# Patient Record
Sex: FEMALE | Race: WHITE | HISPANIC OR LATINO | Employment: UNEMPLOYED | URBAN - METROPOLITAN AREA
[De-identification: names, ages, dates, MRNs, and addresses within clinical notes are randomized per-mention and may not be internally consistent; named-entity substitution may affect disease eponyms.]

---

## 2017-01-11 ENCOUNTER — HOSPITAL ENCOUNTER (OUTPATIENT)
Dept: INFUSION CENTER | Facility: CLINIC | Age: 37
Discharge: HOME | End: 2017-01-11
Payer: MEDICARE

## 2017-01-11 VITALS
DIASTOLIC BLOOD PRESSURE: 89 MMHG | HEART RATE: 63 BPM | TEMPERATURE: 98.7 F | RESPIRATION RATE: 18 BRPM | SYSTOLIC BLOOD PRESSURE: 135 MMHG

## 2017-01-11 PROCEDURE — 96372 THER/PROPH/DIAG INJ SC/IM: CPT

## 2017-01-11 RX ADMIN — OMALIZUMAB 150 MG: 202.5 INJECTION, SOLUTION SUBCUTANEOUS at 14:39

## 2017-01-11 NOTE — PROGRESS NOTES
Xolair injections tolerated in each arm, pt observed for 30 min post without adverse event, pt to return as scheduled

## 2017-02-01 ENCOUNTER — HOSPITAL ENCOUNTER (OUTPATIENT)
Dept: INFUSION CENTER | Facility: CLINIC | Age: 37
Discharge: HOME | End: 2017-02-01
Payer: MEDICARE

## 2017-02-03 ENCOUNTER — HOSPITAL ENCOUNTER (OUTPATIENT)
Dept: INFUSION CENTER | Facility: CLINIC | Age: 37
Discharge: HOME | End: 2017-02-03
Payer: MEDICARE

## 2017-02-03 VITALS
DIASTOLIC BLOOD PRESSURE: 66 MMHG | SYSTOLIC BLOOD PRESSURE: 118 MMHG | TEMPERATURE: 98.3 F | RESPIRATION RATE: 16 BRPM | OXYGEN SATURATION: 99 % | HEART RATE: 67 BPM

## 2017-02-03 PROCEDURE — 96372 THER/PROPH/DIAG INJ SC/IM: CPT

## 2017-02-03 RX ADMIN — OMALIZUMAB 150 MG: 202.5 INJECTION, SOLUTION SUBCUTANEOUS at 15:12

## 2017-02-22 ENCOUNTER — HOSPITAL ENCOUNTER (OUTPATIENT)
Dept: INFUSION CENTER | Facility: CLINIC | Age: 37
Discharge: HOME | End: 2017-02-22
Payer: MEDICARE

## 2017-02-22 VITALS
SYSTOLIC BLOOD PRESSURE: 91 MMHG | DIASTOLIC BLOOD PRESSURE: 62 MMHG | OXYGEN SATURATION: 98 % | RESPIRATION RATE: 18 BRPM | HEART RATE: 64 BPM | TEMPERATURE: 98.6 F

## 2017-02-22 PROCEDURE — 96372 THER/PROPH/DIAG INJ SC/IM: CPT

## 2017-02-22 RX ADMIN — OMALIZUMAB 150 MG: 202.5 INJECTION, SOLUTION SUBCUTANEOUS at 15:12

## 2017-02-22 RX ADMIN — OMALIZUMAB 150 MG: 202.5 INJECTION, SOLUTION SUBCUTANEOUS at 15:11

## 2017-02-22 NOTE — PROGRESS NOTES
Pt  Denies new symptoms or concerns at this time  Epi Pen present, Exp 3/2018  Xolair ordered for injection today

## 2017-03-15 ENCOUNTER — HOSPITAL ENCOUNTER (OUTPATIENT)
Dept: INFUSION CENTER | Facility: CLINIC | Age: 37
Discharge: HOME | End: 2017-03-15
Payer: MEDICARE

## 2017-03-15 VITALS
DIASTOLIC BLOOD PRESSURE: 61 MMHG | RESPIRATION RATE: 18 BRPM | SYSTOLIC BLOOD PRESSURE: 122 MMHG | HEART RATE: 63 BPM | TEMPERATURE: 97 F

## 2017-03-15 PROCEDURE — 96372 THER/PROPH/DIAG INJ SC/IM: CPT

## 2017-03-15 RX ADMIN — OMALIZUMAB 150 MG: 202.5 INJECTION, SOLUTION SUBCUTANEOUS at 14:47

## 2017-03-15 NOTE — PROGRESS NOTES
xolair given in 2 separate injections of 150mg each for a total of 300mg, given in right and left arm, band aids applied  Start of 30min observation

## 2017-04-05 ENCOUNTER — HOSPITAL ENCOUNTER (OUTPATIENT)
Dept: INFUSION CENTER | Facility: CLINIC | Age: 37
Discharge: HOME | End: 2017-04-05
Payer: MEDICARE

## 2017-04-05 VITALS
RESPIRATION RATE: 18 BRPM | HEART RATE: 82 BPM | DIASTOLIC BLOOD PRESSURE: 69 MMHG | SYSTOLIC BLOOD PRESSURE: 134 MMHG | TEMPERATURE: 98.3 F

## 2017-04-05 PROCEDURE — 96372 THER/PROPH/DIAG INJ SC/IM: CPT

## 2017-04-05 RX ADMIN — OMALIZUMAB 150 MG: 202.5 INJECTION, SOLUTION SUBCUTANEOUS at 14:57

## 2017-04-26 ENCOUNTER — HOSPITAL ENCOUNTER (OUTPATIENT)
Dept: INFUSION CENTER | Facility: CLINIC | Age: 37
Discharge: HOME | End: 2017-04-26
Payer: MEDICARE

## 2017-04-26 VITALS
TEMPERATURE: 98.4 F | HEART RATE: 70 BPM | SYSTOLIC BLOOD PRESSURE: 128 MMHG | DIASTOLIC BLOOD PRESSURE: 71 MMHG | RESPIRATION RATE: 18 BRPM

## 2017-04-26 PROCEDURE — 96372 THER/PROPH/DIAG INJ SC/IM: CPT

## 2017-04-26 RX ADMIN — OMALIZUMAB 150 MG: 202.5 INJECTION, SOLUTION SUBCUTANEOUS at 14:26

## 2017-04-26 RX ADMIN — OMALIZUMAB 150 MG: 202.5 INJECTION, SOLUTION SUBCUTANEOUS at 14:25

## 2017-04-26 NOTE — PROGRESS NOTES
Pt  tolerated treatment without incident, No negative effects noted after 30 minute observation  AVS declined  Next appt  confirmed

## 2017-05-17 ENCOUNTER — HOSPITAL ENCOUNTER (OUTPATIENT)
Dept: INFUSION CENTER | Facility: CLINIC | Age: 37
Discharge: HOME | End: 2017-05-17
Payer: MEDICARE

## 2017-05-17 VITALS
DIASTOLIC BLOOD PRESSURE: 60 MMHG | SYSTOLIC BLOOD PRESSURE: 120 MMHG | HEART RATE: 72 BPM | RESPIRATION RATE: 18 BRPM | TEMPERATURE: 98.2 F

## 2017-05-17 PROCEDURE — 96372 THER/PROPH/DIAG INJ SC/IM: CPT

## 2017-05-17 RX ADMIN — OMALIZUMAB 150 MG: 202.5 INJECTION, SOLUTION SUBCUTANEOUS at 14:43

## 2017-05-17 NOTE — PROGRESS NOTES
Pt  offers no complaints  Epi pen in room, exp  March 2018 
Xolair admin  SQ  in ANDRE and ELIO without incident  No negative effects noted after 30 minute observation  Next appt  confirmed 
none

## 2017-06-07 ENCOUNTER — HOSPITAL ENCOUNTER (OUTPATIENT)
Dept: INFUSION CENTER | Facility: CLINIC | Age: 37
Discharge: HOME | End: 2017-06-07
Payer: MEDICARE

## 2017-06-08 ENCOUNTER — HOSPITAL ENCOUNTER (OUTPATIENT)
Dept: INFUSION CENTER | Facility: CLINIC | Age: 37
Discharge: HOME | End: 2017-06-08
Payer: MEDICARE

## 2017-06-08 VITALS
RESPIRATION RATE: 18 BRPM | HEART RATE: 84 BPM | TEMPERATURE: 99 F | DIASTOLIC BLOOD PRESSURE: 69 MMHG | SYSTOLIC BLOOD PRESSURE: 135 MMHG

## 2017-06-08 PROCEDURE — 96372 THER/PROPH/DIAG INJ SC/IM: CPT

## 2017-06-08 RX ADMIN — OMALIZUMAB 150 MG: 202.5 INJECTION, SOLUTION SUBCUTANEOUS at 15:38

## 2017-06-08 NOTE — PROGRESS NOTES
Xolair SQ inj given in B/L UE  EpiPen exp March 2018   Observed 30 min post tx w/out any adverse effects

## 2017-06-20 ENCOUNTER — LAB CONVERSION - ENCOUNTER (OUTPATIENT)
Dept: OTHER | Facility: OTHER | Age: 37
End: 2017-06-20

## 2017-06-20 LAB
25(OH)D3 SERPL-MCNC: 20 NG/ML (ref 30–100)
CALCIUM SERPL-MCNC: 9 MG/DL (ref 8.6–10.2)
DEPRECATED RDW RBC AUTO: 14.1 % (ref 11–15)
FERRITIN SERPL-MCNC: 29 NG/ML (ref 10–154)
HCT VFR BLD AUTO: 40.4 % (ref 35–45)
HGB BLD-MCNC: 13.2 G/DL (ref 11.7–15.5)
MCH RBC QN AUTO: 26.8 PG (ref 27–33)
MCHC RBC AUTO-ENTMCNC: 32.6 G/DL (ref 32–36)
MCV RBC AUTO: 82.2 FL (ref 80–100)
PLATELET # BLD AUTO: 188 THOUSAND/UL (ref 140–400)
PMV BLD AUTO: 10.6 FL (ref 7.5–12.5)
PTH-INTACT SERPL-MCNC: 32 PG/ML (ref 14–64)
RBC # BLD AUTO: 4.92 MILLION/UL (ref 3.8–5.1)
VITAMIN B1, WHOLE BLOOD (HISTORICAL): 96 NMOL/L (ref 78–185)
WBC # BLD AUTO: 6.5 THOUSAND/UL (ref 3.8–10.8)

## 2017-06-26 ENCOUNTER — GENERIC CONVERSION - ENCOUNTER (OUTPATIENT)
Dept: OTHER | Facility: OTHER | Age: 37
End: 2017-06-26

## 2017-06-28 ENCOUNTER — HOSPITAL ENCOUNTER (OUTPATIENT)
Dept: INFUSION CENTER | Facility: CLINIC | Age: 37
Discharge: HOME | End: 2017-06-28
Payer: MEDICARE

## 2017-06-28 VITALS
OXYGEN SATURATION: 98 % | HEART RATE: 74 BPM | TEMPERATURE: 98.7 F | RESPIRATION RATE: 16 BRPM | SYSTOLIC BLOOD PRESSURE: 110 MMHG | DIASTOLIC BLOOD PRESSURE: 72 MMHG

## 2017-06-28 PROCEDURE — 96372 THER/PROPH/DIAG INJ SC/IM: CPT

## 2017-06-28 RX ADMIN — OMALIZUMAB 150 MG: 202.5 INJECTION, SOLUTION SUBCUTANEOUS at 14:42

## 2017-06-28 NOTE — PROGRESS NOTES
Pt resting with no complaints, Xolair given in bilateral arms  Epi pen available with exp March 2018 exp   Call bell within reach, will continue to monitor

## 2017-07-19 ENCOUNTER — HOSPITAL ENCOUNTER (OUTPATIENT)
Dept: INFUSION CENTER | Facility: CLINIC | Age: 37
Discharge: HOME | End: 2017-07-19
Payer: MEDICARE

## 2017-07-19 VITALS
DIASTOLIC BLOOD PRESSURE: 71 MMHG | HEART RATE: 80 BPM | TEMPERATURE: 98.2 F | SYSTOLIC BLOOD PRESSURE: 122 MMHG | RESPIRATION RATE: 18 BRPM

## 2017-07-19 PROCEDURE — 96372 THER/PROPH/DIAG INJ SC/IM: CPT

## 2017-07-19 RX ADMIN — OMALIZUMAB 150 MG: 202.5 INJECTION, SOLUTION SUBCUTANEOUS at 14:35

## 2017-07-19 NOTE — PROGRESS NOTES
Pt is here for xolair  She offers no complaints at this time  Epi pen at bedside with expiration date of March 2018

## 2017-07-19 NOTE — PROGRESS NOTES
xolair given in split dose of 150mg in each syringe for a total of 300mg, one is each arm and pt tolerated it well   Pt will be observed for 30 minutes

## 2017-08-10 NOTE — PLAN OF CARE
"Chief Complaint   Patient presents with   • Hernia     Pt noticed some swelling in her left upper quad of her abd. Having N/V since yesterday. Pt has hx of about 5 hernias in the past     /81 mmHg  Pulse 66  Temp(Src) 36.7 °C (98.1 °F)  Resp 16  Ht 1.676 m (5' 6\")  Wt 108 kg (238 lb 1.6 oz)  BMI 38.45 kg/m2  SpO2 98%  Pt placed back in lobby, educated on triage process, and told to inform staff of any change in condition.     " Problem: PAIN - ADULT  Goal: Verbalizes/displays adequate comfort level or baseline comfort level  Interventions:  - Encourage patient to monitor pain and request assistance  - Assess pain using appropriate pain scale  - Administer analgesics based on type and severity of pain and evaluate response  - Implement non-pharmacological measures as appropriate and evaluate response  - Consider cultural and social influences on pain and pain management  - Notify physician/advanced practitioner if interventions unsuccessful or patient reports new pain  Outcome: Progressing    Problem: INFECTION - ADULT  Goal: Absence or prevention of progression during hospitalization  INTERVENTIONS:  - Assess and monitor for signs and symptoms of infection  - Monitor lab/diagnostic results  - Monitor all insertion sites, i e  indwelling lines, tubes, and drains  - Monitor endotracheal (as able) and nasal secretions for changes in amount and color  - Vina appropriate cooling/warming therapies per order  - Administer medications as ordered  - Instruct and encourage patient and family to use good hand hygiene technique  - Identify and instruct in appropriate isolation precautions for identified infection/condition  Outcome: Progressing  Goal: Absence of fever/infection during neutropenic period  INTERVENTIONS:  - Monitor WBC  - Implement neutropenic guidelines  Outcome: Progressing    Problem: Knowledge Deficit  Goal: Patient/family/caregiver demonstrates understanding of disease process, treatment plan, medications, and discharge instructions  Complete learning assessment and assess knowledge base    Interventions:  - Provide teaching at level of understanding  - Provide teaching via preferred learning methods  Outcome: Progressing

## 2017-08-12 ENCOUNTER — HOSPITAL ENCOUNTER (OUTPATIENT)
Dept: INFUSION CENTER | Facility: CLINIC | Age: 37
Discharge: HOME | End: 2017-08-12
Payer: MEDICARE

## 2017-08-12 VITALS
TEMPERATURE: 98.6 F | HEART RATE: 72 BPM | RESPIRATION RATE: 16 BRPM | OXYGEN SATURATION: 99 % | SYSTOLIC BLOOD PRESSURE: 118 MMHG | DIASTOLIC BLOOD PRESSURE: 64 MMHG

## 2017-08-12 PROCEDURE — 96372 THER/PROPH/DIAG INJ SC/IM: CPT

## 2017-08-12 RX ADMIN — OMALIZUMAB 150 MG: 202.5 INJECTION, SOLUTION SUBCUTANEOUS at 11:07

## 2017-09-09 ENCOUNTER — HOSPITAL ENCOUNTER (OUTPATIENT)
Dept: INFUSION CENTER | Facility: CLINIC | Age: 37
Discharge: HOME | End: 2017-09-09
Payer: MEDICARE

## 2018-01-09 NOTE — PROGRESS NOTES
Active Problems    1  Arthritis (716 90) (M19 90)   2  Asthma (493 90) (J45 909)   3  IUD contraception (V45 51) (Z97 5)   4  Morbid obesity (278 01) (E66 01)   5  Obesity (BMI 30-39 9) (278 00) (E66 9)   6  Preoperative cardiovascular examination (V72 81) (Z01 810)   7  Rheumatoid arthritis (714 0) (M06 9)   8  Visit for pre-operative examination (V72 84) (Z01 818)    Current Meds   1  CeleBREX 200 MG Oral Capsule (Celecoxib); Therapy: 19Oez0611 to (Last Rx:58Qli7460)  Requested for: 36Wai9517 Ordered   2  Dulera 200-5 MCG/ACT Inhalation Aerosol; Therapy: (Recorded:10Nov2015) to Recorded   3  EpiPen 2-Rod 0 3 MG/0 3ML TOLU; Therapy: 37TGV8916 to (Last Rx:58Iea4457)  Requested for: 33Suv0667 Ordered   4  Fluticasone Propionate 50 MCG/ACT Nasal Suspension; Therapy: 35Gqw6359 to (Last Rx:12Yry8796)  Requested for: 48Sfh5791 Ordered   5  Levocetirizine Dihydrochloride 5 MG Oral Tablet; Therapy: (Recorded:10Nov2015) to Recorded   6  Multivitamins CAPS; Therapy: (Recorded:10Nov2015) to Recorded   7  Omnaris 50 MCG/ACT Nasal Suspension; Therapy: 49EFD0347 to (Last Rx:50Ljd9310)  Requested for: 91Jlm9099 Ordered   8  Singulair 10 MG Oral Tablet (Montelukast Sodium); Therapy: 06NWY9116 to (Last Yoli Bars)  Requested for: 56YZG5357 Ordered   9  Spiriva HandiHaler 18 MCG Inhalation Capsule; Therapy: 78Pqd3726 to (Last Rx:92Iam8792)  Requested for: 90Las2666 Ordered   10  Ventolin  (90 Base) MCG/ACT Inhalation Aerosol Solution; Therapy: 87GMV7771 to (Last Rx:08Jun2011)  Requested for: 74ICO3716 Ordered   11  Xolair 150 MG Subcutaneous Solution Reconstituted; Therapy: (Recorded:10Nov2015) to Recorded    Allergies    1  No Known Drug Allergies    2  Seasonal    Discussion/Summary    Called pt to check in: what happened? Was supposed to come in for weight check on 1/18/16?  Pt states life got hectic and she could not make it, but is planning on coming in tomorrow, 1/22/16 to weigh-in and will bring PCP approval letter with her to visit  Reminded pt of office hours and and instructed pt ot schedule nutrition follow-up if questions arise        Signatures   Electronically signed by : SABINO Jennings; Jan 21 2016  9:55AM EST                       (Author)

## 2018-01-11 NOTE — RESULT NOTES
Discussion/Summary   6/16/2017 labs reviewed  Your vitamin B12 level is low at 372-goal after gastric surgery is above 400   Low levels can affect your nerve health  Low levels can also lead to anemia and fatigue  Do NOT take GUMMIE multivitamins because they do not contain enough B vitamins  Alcohol intakes can also lower Vitamin b12 levels  As a gastric surgery patient, it is recommended to avoid alcohol intakes  Recommend that you take an extra  500 mcg of vitamin B12 sublingually (under the tongue) per day  Your vitamin D is low at 20-I am sending a prescription to your pharmacy-please take this weekly for 8 weeks  50,000 IU vitamin D2   IMPORTANT-once you complete the HIGH potency vitamin D you need to be taking a TOTAL of 5602-2498 IU vitamin D3 daily -this INCLUDES the vitamin D in your multivitamin/mineral supplements and what is in your calcium citrate with D  You should be taking a TOTAL of 1500 mg calcium citrate per day -in divided doses of 500 mg each  Both vitamin D and calcium are important for bone health  I will give you a lab slp at your next visit to recheck your vitamin D level  It is important to keep your routine follow-up  Please keep your routine office visit  If you do not have a scheduled routine appointment, please call the office to schedule it  Our office number is [phone]  If you have questions about your results, this will be discussed with you at your upcoming  visit  If you have gotten your most recent labs after your recent visit and have questions, please call the office  I look forward to seeing you at your next visit  Verified Results  (1) COMPREHENSIVE METABOLIC PANEL 89RSY2556 64:34YF Sawyer Mcguire     Test Name Result Flag Reference   GLUCOSE 85 mg/dL  65-99   Fasting reference interval   UREA NITROGEN (BUN) 12 mg/dL  7-25   CREATININE 0 87 mg/dL  0 50-1 10   eGFR NON-AFR   AMERICAN 86 mL/min/1 73m2  > OR = 60   eGFR AFRICAN AMERICAN 99 mL/min/1 73m2  > OR = 60   BUN/CREATININE RATIO   0-49   NOT APPLICABLE (calc)   SODIUM 140 mmol/L  135-146   POTASSIUM 4 1 mmol/L  3 5-5 3   CHLORIDE 106 mmol/L     CARBON DIOXIDE 27 mmol/L  20-31   CALCIUM 9 0 mg/dL  8 6-10 2   PROTEIN, TOTAL 6 8 g/dL  6 1-8 1   ALBUMIN 4 2 g/dL  3 6-5 1   GLOBULIN 2 6 g/dL (calc)  1 9-3 7   ALBUMIN/GLOBULIN RATIO 1 6 (calc)  1 0-2 5   BILIRUBIN, TOTAL 0 6 mg/dL  0 2-1 2   ALKALINE PHOSPHATASE 46 U/L     AST 12 U/L  10-30   ALT 11 U/L  6-29     (1) FOLATE 26YLP9260 10:44AM Daija Alleene     Test Name Result Flag Reference   FOLATE, SERUM 8 0 ng/mL     Reference Range                             Low:           <3 4                             Borderline:    3 4-5 4                             Normal:        >5 4     (1) VITAMIN A 16Jun2017 10:44AM Daija Alleene     Test Name Result Flag Reference   VITAMIN A 62 mcg/dL  38-98   Vitamin supplementation within 24 hours prior to  blood draw may affect the accuracy of the results  This test was developed and its analytical performance  characteristics have been determined by [address] [place] [address]  It has  not been cleared or approved by the [place]  Food and Drug  Administration  This assay has been validated pursuant  to the CLIA regulations and is used for clinical  purposes  (1) VITAMIN B12 41LGT2720 10:44AM Daija Alleene     Test Name Result Flag Reference   VITAMIN B12 372 pg/mL  200-1100   Please Note: Although the reference range for vitamin  B12 is 200-1100 pg/mL, it has been reported that between  5 and 10% of patients with values between 200 and 400  pg/mL may experience neuropsychiatric and hematologic  abnormalities due to occult B12 deficiency; less than 1%  of patients with values above 400 pg/mL will have symptoms       (Q) PTH, INTACT AND CALCIUM [id number] 10:44AM Roz Quinonez     Test Name Result Flag Reference   PARATHYROID HORMONE,$INTACT 32 pg/mL  14-64   Interpretive Guide    Intact PTH           Calcium  ------------------    ----------           -------  Normal Parathyroid    Normal               Normal  Hypoparathyroidism    Low or Low Normal    Low  Hyperparathyroidism     Primary            Normal or High       High     Secondary          High                 Normal or Low     Tertiary           High                 High  Non-Parathyroid     Hypercalcemia      Low or Low Normal    High   CALCIUM 9 0 mg/dL  8 6-10 2     (1) VITAMIN B1 [id number] 10:44AM Kassidy Choe     Test Name Result Flag Reference   VITAMIN B1 (THIAMINE),$BLOOD 96 nmol/L     Vitamin supplementation within 24 hours prior to  blood draw may affect the accuracy of the results  This test was developed and its analytical performance  characteristics have been determined by [address] [place] [address]  It has  not been cleared or approved by the [place]  Food and Drug  Administration  This assay has been validated pursuant  to the CLIA regulations and is used for clinical  purposes       (Q) CBC (H/H, RBC, INDICES, WBC, PLT) 06SFM9400 10:44AM Kassidy Choe     Test Name Result Flag Reference   WHITE BLOOD CELL COUNT 6 5 Thousand/uL  3 8-10 8   RED BLOOD CELL COUNT 4 92 Million/uL  3 80-5 10   HEMOGLOBIN 13 2 g/dL  11 7-15 5   HEMATOCRIT 40 4 %  35 0-45 0   MCV 82 2 fL  80 0-100 0   MCH 26 8 pg L 27 0-33 0   MCHC 32 6 g/dL  32 0-36 0   RDW 14 1 %  11 0-15 0   PLATELET COUNT 501 Thousand/uL  140-400   MPV 10 6 fL  7 5-12 5     (1) OP JUANA FERRITIN 03JPK9523 10:44AM Kassidy Choe     Test Name Result Flag Reference   FERRITIN 29 ng/mL       *(Q) VITAMIN D, 25-HYDROXY, LC/MS/MS [id number] 10:44AM Kassidy Choe   REPORT COMMENT:  FASTING:YES     Test Name Result Flag Reference   VITAMIN D, 25-OH, TOTAL 20 ng/mL L    Vitamin D Status         25-OH Vitamin D:     Deficiency:                    <20 ng/mL  Insufficiency:             20 - 29 ng/mL  Optimal:                 > or = 30 ng/mL     For 25-OH Vitamin D testing on patients on   D2-supplementation and patients for whom quantitation   of D2 and D3 fractions is required, the QuestAssureD(TM)  25-OH VIT D, (D2,D3), LC/MS/MS is recommended: order   code 85481 (patients >2yrs)  For more information on this test, go to:  http://education  SADAR 3D/faq/JNX870  (This link is being provided for   informational/educational purposes only )       Plan  Health Maintenance, Postgastrectomy malabsorption, Status post laparoscopic sleeve  gastrectomy, Vitamin D deficiency    · Vitamin D (Ergocalciferol) 01746 UNIT Oral Capsule; TAKE 1 CAPSULE WEEKLY

## 2018-01-12 NOTE — PROGRESS NOTES
Message  Outreach phone call; how is patient coming along with pre-op weight loss  Patient said she continues to lose and has no ? Aníbal Ped Patient encouraged to reach out to staff if in need of support  [place]      Active Problems    1  Arthritis (716 90) (M19 90)   2  Asthma (493 90) (J45 909)   3  IUD contraception (V45 51) (Z97 5)   4  Morbid obesity (278 01) (E66 01)   5  Obesity (BMI 30-39 9) (278 00) (E66 9)   6  Preoperative cardiovascular examination (V72 81) (Z01 810)   7  Rheumatoid arthritis (714 0) (M06 9)   8  Visit for pre-operative examination (V72 84) (Z01 818)    Current Meds   1  CeleBREX 200 MG Oral Capsule (Celecoxib); Therapy: 13Eth2133 to (Last Rx:32Ryx8462)  Requested for: 41Faz2985 Ordered   2  Dulera 200-5 MCG/ACT Inhalation Aerosol; Therapy: (Recorded:10Nov2015) to Recorded   3  EpiPen 2-Rod 0 3 MG/0 3ML TOLU; Therapy: 88YCI6080 to (Last Rx:29Imy1797)  Requested for: 13Oct2011 Ordered   4  Fluticasone Propionate 50 MCG/ACT Nasal Suspension; Therapy: 79Ezt6310 to (Last Rx:18Qqp9586)  Requested for: 60Kzb6719 Ordered   5  Levocetirizine Dihydrochloride 5 MG Oral Tablet; Therapy: (Recorded:10Nov2015) to Recorded   6  Multivitamins CAPS; Therapy: (Recorded:10Nov2015) to Recorded   7  Omeprazole 20 MG Oral Tablet Delayed Release; Take one capsule daily; Therapy: 97EWI0485 to (Last Rx:83Rmi2267) Ordered   8  Omnaris 50 MCG/ACT Nasal Suspension; Therapy: 62KCA3733 to (Last Rx:04Xcl6328)  Requested for: 12Oct2010 Ordered   9  Singulair 10 MG Oral Tablet (Montelukast Sodium); Therapy: 70JDB2543 to (Last Maralee Miu)  Requested for: 78PGJ8037 Ordered   10  Spiriva HandiHaler 18 MCG Inhalation Capsule; Therapy: 90Owb3153 to (Last Rx:13Plw0278)  Requested for: 34Cgq4201 Ordered   11  Ventolin  (90 Base) MCG/ACT Inhalation Aerosol Solution; Therapy: 05GSK1931 to (Last Rx:08Jun2011)  Requested for: 49SSD8575 Ordered   12   Xolair 150 MG Subcutaneous Solution Reconstituted; Therapy: (Recorded:10Nov2015) to Recorded    Allergies    1  No Known Drug Allergies    2   Seasonal    Signatures   Electronically signed by : Mary Rouse MSWLCSW; Feb 11 2016 11:31AM EST                       (Author)

## 2018-01-13 NOTE — PROGRESS NOTES
Active Problems    1  Arthritis (716 90) (M19 90)   2  Asthma (493 90) (J45 909)   3  IUD contraception (V45 51) (Z97 5)   4  Morbid obesity (278 01) (E66 01)   5  Obesity (BMI 30-39 9) (278 00) (E66 9)   6  Preoperative cardiovascular examination (V72 81) (Z01 810)   7  Rheumatoid arthritis (714 0) (M06 9)   8  Visit for pre-operative examination (V72 84) (Z01 818)    Current Meds   1  CeleBREX 200 MG Oral Capsule (Celecoxib); Therapy: 49Owx0822 to (Last Rx:88Nys3962)  Requested for: 94Ikd3680 Ordered   2  Dulera 200-5 MCG/ACT Inhalation Aerosol; Therapy: (Recorded:10Nov2015) to Recorded   3  EpiPen 2-Rod 0 3 MG/0 3ML TOLU; Therapy: 22EEZ9869 to (Last Rx:09Ukl0940)  Requested for: 13Oct2011 Ordered   4  Fluticasone Propionate 50 MCG/ACT Nasal Suspension; Therapy: 60Axe3975 to (Last Rx:01Aug2011)  Requested for: 02Tiu2108 Ordered   5  Levocetirizine Dihydrochloride 5 MG Oral Tablet; Therapy: (Recorded:10Nov2015) to Recorded   6  Multivitamins CAPS; Therapy: (Recorded:10Nov2015) to Recorded   7  Omnaris 50 MCG/ACT Nasal Suspension; Therapy: 50NBL8621 to (Last Rx:12Oct2010)  Requested for: 35Dau0373 Ordered   8  Singulair 10 MG Oral Tablet (Montelukast Sodium); Therapy: 05FMU8900 to (Last Mari Genre)  Requested for: 18KVW7104 Ordered   9  Spiriva HandiHaler 18 MCG Inhalation Capsule; Therapy: 09Jsr4830 to (Last Rx:68Ggd7233)  Requested for: 70Nqq3234 Ordered   10  Ventolin  (90 Base) MCG/ACT Inhalation Aerosol Solution; Therapy: 26HOY1819 to (Last Rx:08Jun2011)  Requested for: 27CBR6149 Ordered   11  Xolair 150 MG Subcutaneous Solution Reconstituted; Therapy: (Recorded:10Nov2015) to Recorded    Allergies    1  No Known Drug Allergies    2  Seasonal    Discussion/Summary    Received e-mail referral from Sue Upton to follow-up with pt for pre-op weight loss  Called and spoke to pt   Pt states she has completed all six lesson plans in bariatric manual and is practicing all of the pre-operative dietary goals  Pt states she weighs herself at home and is currently 248#  Pt states she is walking an average of 10,000 steps per day, is drinking only plain water, and following 30/60 rule  Pt states she is planning on coming in for weigh-in on Monday, 1/18/16  Instructed pt to call me if she has additional weight to lose after weigh-in on Monday        Signatures   Electronically signed by : SABINO Nash; Jan 13 2016  2:53PM EST                       (Author)

## 2018-01-13 NOTE — MISCELLANEOUS
Message  2/19/2016 @ 1130 - post op follow up phone call completed  Pt is sipping liquids, using IS as instructed, reinforced importance of using IS to help prevent pneumonia  Ambulating about home without difficulty  Minimal pain, not using Percocet  Reaffirmed examples of liquid diet over the next week  Pt stated understanding about discharge instructions and medication adjustments  Pt educated on [address] [facility]  Follow up appt with surgeon scheduled for next week  Instructed to call with any additional questions or concerns  Active Problems    1  Arthritis (716 90) (M19 90)   2  Asthma (493 90) (J45 909)   3  IUD contraception (V45 51) (Z97 5)   4  Morbid obesity (278 01) (E66 01)   5  Obesity (BMI 30-39 9) (278 00) (E66 9)   6  Preoperative cardiovascular examination (V72 81) (Z01 810)   7  Rheumatoid arthritis (714 0) (M06 9)   8  Visit for pre-operative examination (V72 84) (Z01 818)    Current Meds   1  CeleBREX 200 MG Oral Capsule (Celecoxib); Therapy: 64Dxw7072 to (Last Rx:18Apr2011)  Requested for: 18Apr2011 Ordered   2  Dulera 200-5 MCG/ACT Inhalation Aerosol; Therapy: (Recorded:10Nov2015) to Recorded   3  EpiPen 2-Rod 0 3 MG/0 3ML TOLU; Therapy: 42CZE9312 to (Last Rx:13Oct2011)  Requested for: 13Oct2011 Ordered   4  Fluticasone Propionate 50 MCG/ACT Nasal Suspension; Therapy: 13Zva3193 to (Last Rx:13Nli7947)  Requested for: 37Mhv0244 Ordered   5  Levocetirizine Dihydrochloride 5 MG Oral Tablet; Therapy: (Recorded:10Nov2015) to Recorded   6  Multivitamins CAPS; Therapy: (Recorded:10Nov2015) to Recorded   7  Omeprazole 20 MG Oral Tablet Delayed Release; Take one capsule daily; Therapy: 95OPU5530 to (Last Rx:22Ifv9022) Ordered   8  Omnaris 50 MCG/ACT Nasal Suspension; Therapy: 85YUQ4614 to (Last Rx:96Zcx0265)  Requested for: 12Oct2010 Ordered   9  Singulair 10 MG Oral Tablet (Montelukast Sodium);    Therapy: 88UKY1543 to (Last Tedra Chamber)  Requested for: 47NBL8018 Ordered   10  Spiriva HandiHaler 18 MCG Inhalation Capsule; Therapy: 21Eaj5379 to (Last Rx:35Fcs5745)  Requested for: 69Nxl6957 Ordered   11  Ventolin  (90 Base) MCG/ACT Inhalation Aerosol Solution; Therapy: 85FRF9519 to (Last Rx:08Jun2011)  Requested for: 12IIL2172 Ordered   12  Xolair 150 MG Subcutaneous Solution Reconstituted; Therapy: (Recorded:10Nov2015) to Recorded    Allergies    1  No Known Drug Allergies    2   Seasonal    Signatures   Electronically signed by : Jose D Pruitt, ; Feb 19 2016 11:50AM EST                       (Author)

## 2018-01-15 NOTE — RESULT NOTES
Message   let patient know vitamin A and vitamin B1 came back now and are normal range/continue supplements as previously advised  CR     Verified Results  (1) VITAMIN A 02Nov2016 07:20AM Ara Base Order Number: [id number]     Test Name Result Flag Reference   VITAMIN A 43 ug/dL  20 - 65   Performed at:  [address] [address]  [id number]  : Mike Thornton MD, Phone:  [phone]     (1) VITAMIN B1, WHOLE BLOOD 40BRG4802 07:20AM [facility] Order Number: [id number]     Test Name Result Flag Reference   VITAMIN B1, WHOLE BLOOD 95 0 nmol/L  66 5 - 200 0   Performed at:  [address] [address]  [id number]  : Mike Thornton MD, Phone:  [phone]

## 2018-01-16 NOTE — MISCELLANEOUS
Message  I called patient to on behalf of Adelia Thapa to inform her that her vitamin D level is low, she is to take 50,000 IU of vitamin D until complete  Then she should take 2,000 IU daily  She will have a lab slip for a repeat vitamin D level given to her on next follow up visit  Active Problems    1  Arthritis (716 90) (M19 90)   2  Asthma (493 90) (J45 909)   3  IUD contraception (V45 51) (Z97 5)   4  Low vitamin B12 level (266 2) (E53 8)   5  Other constipation (564 09) (K59 09)   6  Postgastrectomy malabsorption (579 3) (K91 2,Z90 3)   7  Preoperative cardiovascular examination (V72 81) (Z01 810)   8  Rheumatoid arthritis (714 0) (M06 9)   9  Status post laparoscopic sleeve gastrectomy (V45 86) (Z98 84)   10  Visit for pre-operative examination (V72 84) (Z01 818)   11  Vitamin D deficiency (268 9) (E55 9)    Current Meds   1  Biotin 5000 MCG Oral Tablet Recorded   2  Dulera 200-5 MCG/ACT Inhalation Aerosol; Therapy: (Recorded:10Nov2015) to Recorded   3  EpiPen 2-Rod 0 3 MG/0 3ML TOLU; Therapy: 69JRF4740 to (Last Rx:13Oct2011)  Requested for: 13Oct2011 Ordered   4  Fluticasone Propionate 50 MCG/ACT Nasal Suspension; Therapy: 10Gnn6146 to (Last Rx:01Aug2011)  Requested for: 86Whv4771 Ordered   5  Levocetirizine Dihydrochloride 5 MG Oral Tablet; Therapy: (Recorded:10Nov2015) to Recorded   6  Multivitamins CAPS; Therapy: (Recorded:10Nov2015) to Recorded   7  Omeprazole 20 MG Oral Capsule Delayed Release; take 1 capsule daily; Therapy: 02Kxs4815 to (Evaluate:06Alg3834)  Requested for: 20Lvo6059; Last   Rx:22Lau4155 Ordered   8  Omeprazole 20 MG Oral Capsule Delayed Release; TAKE ONE CAPSULE BY MOUTH   ONE TIME DAILY; Therapy: 05CZI5778 to (Evaluate:61Icq0646)  Requested for: 21JFI8160; Last   Rx:11Oct2016 Ordered   9  Omeprazole 20 MG Oral Tablet Delayed Release; Take one capsule daily; Therapy: 06VQH5455 to (Last Rx:58Gzm9643) Ordered   10  Omnaris 50 MCG/ACT Nasal Suspension;     Therapy: 71PUN4369 to (Last Rx:12Oct2010)  Requested for: 12Oct2010 Ordered   11  Singulair 10 MG Oral Tablet (Montelukast Sodium); Therapy: 44NMO8295 to (Last [facility])  Requested for: 89RZH2055 Ordered   12  Spiriva HandiHaler 18 MCG Inhalation Capsule; Therapy: 83Rjw6420 to (Last Rx:18Xmm8031)  Requested for: 74Doc6315 Ordered   13  Ventolin  (90 Base) MCG/ACT Inhalation Aerosol Solution; Therapy: 08UMC7372 to (Last Rx:08Jun2011)  Requested for: 26GWL0438 Ordered   14  Vitamin D (Ergocalciferol) 10169 UNIT Oral Capsule; TAKE 1 CAPSULE WEEKLY; Therapy: 80PQC8487 to (Last AE:29PBL8823)  Requested for: 26Jun2017 Ordered   15  Xolair 150 MG Subcutaneous Solution Reconstituted; Therapy: (Recorded:10Nov2015) to Recorded    Allergies    1  No Known Drug Allergies    2   Seasonal    Signatures   Electronically signed by : Erwin Magaña, ; Jun 26 2017  3:00PM EST                       (Author)

## 2018-01-17 NOTE — PROGRESS NOTES
Discussion/Summary  Discussion Summary:   Reviewed post-operative pureed and soft foods diet with pt  Discussed gradual diet advancement and portion size progression  Discussed adequate hydration, signs and symptoms of dehydration  Discussed recommended post-operative vitamin supplementation and protein supplements  Discussed importance of regular physical activity  Provided pt with contact information for future questions/concerns  Active Problems    1  Arthritis (716 90) (M19 90)   2  Asthma (493 90) (J45 909)   3  IUD contraception (V45 51) (Z97 5)   4  Morbid obesity (278 01) (E66 01)   5  Obesity (BMI 30-39 9) (278 00) (E66 9)   6  Postgastrectomy malabsorption (579 3) (K91 2,Z90 3)   7  Preoperative cardiovascular examination (V72 81) (Z01 810)   8  Rheumatoid arthritis (714 0) (M06 9)   9  Status post laparoscopic sleeve gastrectomy (V45 86) (Z98 84)   10  Visit for pre-operative examination (V72 84) (E76 513)    Past Medical History    1  History of No significant past medical history    Surgical History    1  History of Appendectomy   2  History of Gastrectomy Sleeve   3  History of Oral Surgery   4  History of Tonsillectomy With Adenoidectomy    Family History    1  Family history of malignant neoplasm of brain (V16 8) (Z80 8)   2  Family history of transient ischemic attacks (V17 1) (Z82 3)    3  Family history of myocardial infarction (V17 3) (Z82 49)    Social History    ·    · Denied: History of Drug use   · Former smoker (V15 82) (J80 961)   · IUD contraception (V45 51) (Z97 5)   · Social alcohol use (Z78 9)    Current Meds   1  Dulera 200-5 MCG/ACT Inhalation Aerosol; Therapy: (Recorded:10Nov2015) to Recorded   2  EpiPen 2-Rod 0 3 MG/0 3ML TOLU; Therapy: 59PNM8397 to (Last Rx:13Oct2011)  Requested for: 13Oct2011 Ordered   3  Fluticasone Propionate 50 MCG/ACT Nasal Suspension; Therapy: 01Aug2011 to (Last Rx:01Aug2011)  Requested for: 01Aug2011 Ordered   4   Levocetirizine Dihydrochloride 5 MG Oral Tablet; Therapy: (Recorded:10Nov2015) to Recorded   5  Multivitamins CAPS; Therapy: (Recorded:10Nov2015) to Recorded   6  Omeprazole 20 MG Oral Tablet Delayed Release; Take one capsule daily; Therapy: 15AKM4030 to (Last Rx:66Ayb0115) Ordered   7  Omnaris 50 MCG/ACT Nasal Suspension; Therapy: 13DVE8748 to (Last Rx:12Oct2010)  Requested for: 12Oct2010 Ordered   8  Singulair 10 MG Oral Tablet (Montelukast Sodium); Therapy: 45XZB1927 to (Last Dariela Quarto)  Requested for: 23INT9947 Ordered   9  Spiriva HandiHaler 18 MCG Inhalation Capsule; Therapy: 60Tjq6225 to (Last Rx:80Zff0247)  Requested for: 58Vvj7483 Ordered   10  Ventolin  (90 Base) MCG/ACT Inhalation Aerosol Solution; Therapy: 63PXQ6061 to (Last Rx:08Jun2011)  Requested for: 60PKQ1985 Ordered   11  Xolair 150 MG Subcutaneous Solution Reconstituted; Therapy: (Recorded:10Nov2015) to Recorded    Allergies    1  No Known Drug Allergies    2   Seasonal    Future Appointments    Date/Time Provider Specialty Site   03/29/2016 02:00 PM Jenna Choe, [facility] General Surgery [facility]     Signatures   Electronically signed by : THOMPSON Her; Feb 25 2016  4:13PM EST                       (Author)    Electronically signed by : SOUMYA Loredo ; Feb 25 2016  4:15PM EST                       (Validation)

## 2018-02-12 RX ORDER — EPINEPHRINE 0.3 MG/.3ML
INJECTION SUBCUTANEOUS
COMMUNITY
Start: 2011-10-13

## 2018-02-12 RX ORDER — ERGOCALCIFEROL 1.25 MG/1
1 CAPSULE ORAL WEEKLY
COMMUNITY
Start: 2017-06-26 | End: 2020-02-11 | Stop reason: ALTCHOICE

## 2018-02-12 RX ORDER — OMEPRAZOLE 20 MG/1
1 CAPSULE, DELAYED RELEASE ORAL DAILY
COMMUNITY
Start: 2016-06-17 | End: 2020-02-11 | Stop reason: ALTCHOICE

## 2018-02-16 ENCOUNTER — TELEPHONE (OUTPATIENT)
Dept: BARIATRICS | Facility: CLINIC | Age: 38
End: 2018-02-16

## 2018-04-09 RX ORDER — OMEPRAZOLE 20 MG/1
CAPSULE, DELAYED RELEASE ORAL
Qty: 90 CAPSULE | Refills: 0 | OUTPATIENT
Start: 2018-04-09

## 2020-02-11 PROBLEM — Z98.890 HISTORY OF SURGICAL PROCEDURE: Status: ACTIVE | Noted: 2020-02-11

## 2020-02-11 PROBLEM — Z97.5 PRESENCE OF INTRAUTERINE CONTRACEPTIVE DEVICE: Status: ACTIVE | Noted: 2019-04-29

## 2020-02-11 PROBLEM — J30.1 CHRONIC SEASONAL ALLERGIC RHINITIS DUE TO POLLEN: Status: ACTIVE | Noted: 2020-02-11

## 2020-02-11 PROBLEM — J33.9 NASAL POLYPS: Status: ACTIVE | Noted: 2020-02-11

## 2020-02-11 PROBLEM — R87.611 ATYPICAL SQUAMOUS CELLS CANNOT EXCLUDE HIGH GRADE SQUAMOUS INTRAEPITHELIAL LESION ON CYTOLOGIC SMEAR OF CERVIX (ASC-H): Status: ACTIVE | Noted: 2019-01-11

## 2020-02-11 PROBLEM — J30.89 ALLERGIC RHINITIS DUE TO HOUSE DUST MITE: Status: ACTIVE | Noted: 2020-02-11

## 2020-02-11 PROBLEM — J30.81 ALLERGIC RHINITIS DUE TO ANIMAL (CAT) (DOG) HAIR AND DANDER: Status: ACTIVE | Noted: 2020-02-11

## 2020-02-11 PROBLEM — H10.13 ALLERGIC CONJUNCTIVITIS OF BOTH EYES: Status: ACTIVE | Noted: 2020-02-11

## 2020-02-11 PROBLEM — E55.9 VITAMIN D DEFICIENCY: Status: ACTIVE | Noted: 2017-06-26

## 2020-02-11 PROBLEM — E66.9 ADIPOSITY: Status: ACTIVE | Noted: 2020-02-11

## 2021-01-13 ENCOUNTER — TRANSCRIBE ORDERS (OUTPATIENT)
Dept: ADMINISTRATIVE | Facility: HOSPITAL | Age: 41
End: 2021-01-13

## 2021-01-13 DIAGNOSIS — M17.0 BILATERAL PRIMARY OSTEOARTHRITIS OF KNEE: Primary | ICD-10-CM

## 2021-01-13 DIAGNOSIS — M15.0 PRIMARY GENERALIZED (OSTEO)ARTHRITIS: ICD-10-CM

## 2021-02-05 ENCOUNTER — HOSPITAL ENCOUNTER (OUTPATIENT)
Dept: RADIOLOGY | Facility: HOSPITAL | Age: 41
Discharge: HOME | End: 2021-02-05
Admitting: RADIOLOGY
Payer: COMMERCIAL

## 2021-02-05 ENCOUNTER — HOSPITAL ENCOUNTER (OUTPATIENT)
Dept: RADIOLOGY | Facility: HOSPITAL | Age: 41
Discharge: HOME | End: 2021-02-05
Payer: COMMERCIAL

## 2021-02-05 ENCOUNTER — TRANSCRIBE ORDERS (OUTPATIENT)
Dept: RADIOLOGY | Facility: HOSPITAL | Age: 41
End: 2021-02-05

## 2021-02-05 DIAGNOSIS — M17.0 PRIMARY OSTEOARTHRITIS OF BOTH KNEES: Primary | ICD-10-CM

## 2021-02-05 DIAGNOSIS — M17.0 PRIMARY OSTEOARTHRITIS OF BOTH KNEES: ICD-10-CM

## 2021-02-05 DIAGNOSIS — M15.0 PRIMARY GENERALIZED (OSTEO)ARTHRITIS: ICD-10-CM

## 2021-02-05 PROCEDURE — 76882 US LMTD JT/FCL EVL NVASC XTR: CPT

## 2021-02-05 PROCEDURE — 73562 X-RAY EXAM OF KNEE 3: CPT

## 2024-02-21 PROBLEM — H10.13 ALLERGIC CONJUNCTIVITIS OF BOTH EYES: Status: RESOLVED | Noted: 2020-02-11 | Resolved: 2024-02-21

## 2024-03-22 ENCOUNTER — TRANSCRIBE ORDERS (OUTPATIENT)
Dept: LAB | Facility: CLINIC | Age: 44
End: 2024-03-22

## 2024-03-22 DIAGNOSIS — Z13.9 SCREENING FOR UNSPECIFIED CONDITION: Primary | ICD-10-CM

## 2024-03-22 DIAGNOSIS — Z13.9 SCREENING FOR UNSPECIFIED CONDITION: ICD-10-CM

## 2024-03-27 ENCOUNTER — APPOINTMENT (OUTPATIENT)
Dept: LAB | Facility: CLINIC | Age: 44
End: 2024-03-27

## 2024-03-27 LAB
ALBUMIN SERPL BCP-MCNC: 4.2 G/DL (ref 3.5–5)
ALP SERPL-CCNC: 33 U/L (ref 34–104)
ALT SERPL W P-5'-P-CCNC: 10 U/L (ref 7–52)
ANION GAP SERPL CALCULATED.3IONS-SCNC: 12 MMOL/L (ref 4–13)
AST SERPL W P-5'-P-CCNC: 15 U/L (ref 13–39)
BILIRUB SERPL-MCNC: 0.48 MG/DL (ref 0.2–1)
BUN SERPL-MCNC: 10 MG/DL (ref 5–25)
CALCIUM SERPL-MCNC: 8.6 MG/DL (ref 8.4–10.2)
CHLORIDE SERPL-SCNC: 103 MMOL/L (ref 96–108)
CHOLEST SERPL-MCNC: 197 MG/DL
CO2 SERPL-SCNC: 26 MMOL/L (ref 21–32)
CREAT SERPL-MCNC: 0.69 MG/DL (ref 0.6–1.3)
GFR SERPL CREATININE-BSD FRML MDRD: 106 ML/MIN/1.73SQ M
GLUCOSE P FAST SERPL-MCNC: 71 MG/DL (ref 65–99)
HDLC SERPL-MCNC: 84 MG/DL
LDLC SERPL CALC-MCNC: 91 MG/DL (ref 0–100)
NONHDLC SERPL-MCNC: 113 MG/DL
POTASSIUM SERPL-SCNC: 3.7 MMOL/L (ref 3.5–5.3)
PROT SERPL-MCNC: 6.4 G/DL (ref 6.4–8.4)
SODIUM SERPL-SCNC: 141 MMOL/L (ref 135–147)
TRIGL SERPL-MCNC: 111 MG/DL
TSH SERPL DL<=0.05 MIU/L-ACNC: 1.98 UIU/ML (ref 0.45–4.5)

## 2024-03-27 PROCEDURE — 80061 LIPID PANEL: CPT

## 2024-03-27 PROCEDURE — 84443 ASSAY THYROID STIM HORMONE: CPT

## 2024-03-27 PROCEDURE — 36415 COLL VENOUS BLD VENIPUNCTURE: CPT

## 2024-03-27 PROCEDURE — 80053 COMPREHEN METABOLIC PANEL: CPT

## 2024-06-21 DIAGNOSIS — Z00.6 ENCOUNTER FOR EXAMINATION FOR NORMAL COMPARISON OR CONTROL IN CLINICAL RESEARCH PROGRAM: ICD-10-CM

## 2025-03-28 ENCOUNTER — APPOINTMENT (OUTPATIENT)
Dept: LAB | Facility: CLINIC | Age: 45
End: 2025-03-28
Payer: COMMERCIAL

## 2025-03-28 DIAGNOSIS — Z00.6 ENCOUNTER FOR EXAMINATION FOR NORMAL COMPARISON OR CONTROL IN CLINICAL RESEARCH PROGRAM: ICD-10-CM

## 2025-03-28 PROCEDURE — 36415 COLL VENOUS BLD VENIPUNCTURE: CPT

## 2025-04-11 LAB
APOB+LDLR+PCSK9 GENE MUT ANL BLD/T: NOT DETECTED
BRCA1+BRCA2 DEL+DUP + FULL MUT ANL BLD/T: NOT DETECTED
MLH1+MSH2+MSH6+PMS2 GN DEL+DUP+FUL M: NOT DETECTED